# Patient Record
Sex: FEMALE | Race: OTHER | URBAN - METROPOLITAN AREA
[De-identification: names, ages, dates, MRNs, and addresses within clinical notes are randomized per-mention and may not be internally consistent; named-entity substitution may affect disease eponyms.]

---

## 2017-01-17 NOTE — PATIENT DISCUSSION
Posterior Capsular Fibrosis Counseling: The diagnosis of posterior capsular fibrosis (PCF), also referred to as a secondary cataract or posterior capsular opacification (PCO), was discussed with the patient. The patient understands and desires to monitor condition for now.

## 2017-01-17 NOTE — PATIENT DISCUSSION
Epiretinal Membrane Counseling: The diagnosis and natural history of epiretinal membrane was discussed with the patient including the risk of progression with retinal traction resulting in visual distortion. The patient is instructed to call back immediately if any vision changes, and keep follow up as scheduled.

## 2022-04-14 ENCOUNTER — NEW PATIENT (OUTPATIENT)
Dept: URBAN - METROPOLITAN AREA CLINIC 9 | Facility: CLINIC | Age: 52
End: 2022-04-14

## 2022-04-14 DIAGNOSIS — H17.13: ICD-10-CM

## 2022-04-14 DIAGNOSIS — H02.223: ICD-10-CM

## 2022-04-14 DIAGNOSIS — H04.123: ICD-10-CM

## 2022-04-14 DIAGNOSIS — H02.226: ICD-10-CM

## 2022-04-14 DIAGNOSIS — H18.832: ICD-10-CM

## 2022-04-14 DIAGNOSIS — H25.13: ICD-10-CM

## 2022-04-14 DIAGNOSIS — H16.223: ICD-10-CM

## 2022-04-14 PROCEDURE — 99204 OFFICE O/P NEW MOD 45 MIN: CPT

## 2022-04-14 RX ORDER — SODIUM CHLORIDE 50 MG/G
OINTMENT OPHTHALMIC EVERY OTHER DAY
Start: 2022-04-14

## 2022-04-14 RX ORDER — LOTEPREDNOL ETABONATE 5 MG/G
1/2 OINTMENT OPHTHALMIC EVERY OTHER DAY
Start: 2022-04-14

## 2022-04-14 ASSESSMENT — KERATOMETRY
OS_K1POWER_DIOPTERS: 40.00
OD_AXISANGLE_DEGREES: 151
OS_AXISANGLE_DEGREES: 42
OS_AXISANGLE2_DEGREES: 132
OD_K1POWER_DIOPTERS: 40.50
OS_K2POWER_DIOPTERS: 40.25
OD_AXISANGLE2_DEGREES: 61
OD_K2POWER_DIOPTERS: 40.75

## 2022-04-14 ASSESSMENT — VISUAL ACUITY
OD_CC: J1+
OS_GLARE: 20/25-1
OD_CC: 20/25
OD_GLARE: 20/25-1
OU_CC: J1+
OS_CC: J1+
OU_CC: 20/25
OS_CC: 20/25

## 2022-04-14 ASSESSMENT — TONOMETRY
OD_IOP_MMHG: 20
OS_IOP_MMHG: 21

## 2022-04-14 NOTE — PATIENT DISCUSSION
H/O trying Restasis and Xiidra with no improvement. She has also tried Lotemax gel drop and did not like the delivery process. Consider Lotemax drop (non gel).

## 2022-06-16 ENCOUNTER — ESTABLISHED PATIENT (OUTPATIENT)
Dept: URBAN - METROPOLITAN AREA CLINIC 9 | Facility: CLINIC | Age: 52
End: 2022-06-16

## 2022-06-16 DIAGNOSIS — H25.13: ICD-10-CM

## 2022-06-16 DIAGNOSIS — H17.13: ICD-10-CM

## 2022-06-16 DIAGNOSIS — H02.226: ICD-10-CM

## 2022-06-16 DIAGNOSIS — H18.832: ICD-10-CM

## 2022-06-16 DIAGNOSIS — H04.123: ICD-10-CM

## 2022-06-16 DIAGNOSIS — H02.223: ICD-10-CM

## 2022-06-16 DIAGNOSIS — H16.223: ICD-10-CM

## 2022-06-16 PROCEDURE — 92014 COMPRE OPH EXAM EST PT 1/>: CPT

## 2022-06-16 ASSESSMENT — TONOMETRY
OD_IOP_MMHG: 18
OS_IOP_MMHG: 19

## 2022-06-16 ASSESSMENT — VISUAL ACUITY
OU_CC: 20/20
OU_CC: J1
OS_CC: 20/25
OS_CC: J2
OD_CC: J2
OD_CC: 20/25

## 2022-06-16 NOTE — PATIENT DISCUSSION
Prior to opening her eyes when waking from sleeping, use Celluvisc. Encourage Celluvisc throughout the day.

## 2022-06-16 NOTE — PATIENT DISCUSSION
Completed Lotemax ointment sample. Ran out 2 weeks ago. Did not note if Mauro or Lotemax is better. Continue Mauro 128 ointment QHS OS.

## 2022-08-31 ENCOUNTER — PREPPED CHART (OUTPATIENT)
Dept: URBAN - METROPOLITAN AREA CLINIC 9 | Facility: CLINIC | Age: 52
End: 2022-08-31

## 2022-09-01 ENCOUNTER — ESTABLISHED PATIENT (OUTPATIENT)
Dept: URBAN - METROPOLITAN AREA CLINIC 9 | Facility: CLINIC | Age: 52
End: 2022-09-01

## 2022-09-01 DIAGNOSIS — H18.832: ICD-10-CM

## 2022-09-01 DIAGNOSIS — H16.223: ICD-10-CM

## 2022-09-01 DIAGNOSIS — H02.226: ICD-10-CM

## 2022-09-01 DIAGNOSIS — H02.223: ICD-10-CM

## 2022-09-01 DIAGNOSIS — H17.13: ICD-10-CM

## 2022-09-01 PROCEDURE — 99214 OFFICE O/P EST MOD 30 MIN: CPT

## 2022-09-01 RX ORDER — CYCLOSPORINE 0 MG/ML: 1 SOLUTION/ DROPS OPHTHALMIC; TOPICAL TWICE A DAY

## 2022-09-01 RX ORDER — LOTEPREDNOL ETABONATE 2.5 MG/ML: 1 SUSPENSION/ DROPS OPHTHALMIC AS NEEDED

## 2022-09-01 ASSESSMENT — VISUAL ACUITY
OS_CC: J1
OD_CC: J1+
OD_CC: 20/25-2
OS_CC: 20/25-2

## 2022-09-01 ASSESSMENT — TONOMETRY
OS_IOP_MMHG: 13
OD_IOP_MMHG: 12

## 2022-09-01 NOTE — PATIENT DISCUSSION
H/O trying Restasis, Chayito Betters with no improvement. She has also tried Lotemax gel drop and did not like the delivery process as well as Lotemax eladia.

## 2022-12-01 ENCOUNTER — ESTABLISHED PATIENT (OUTPATIENT)
Dept: URBAN - METROPOLITAN AREA CLINIC 9 | Facility: CLINIC | Age: 52
End: 2022-12-01

## 2022-12-01 PROCEDURE — 68761 CLOSE TEAR DUCT OPENING: CPT

## 2022-12-01 PROCEDURE — A4262 TEMPORARY TEAR DUCT PLUG: HCPCS

## 2022-12-01 PROCEDURE — 99213 OFFICE O/P EST LOW 20 MIN: CPT

## 2022-12-01 ASSESSMENT — VISUAL ACUITY
OS_CC: J1
OS_CC: 20/30-2
OD_CC: 20/30-2
OD_CC: J1

## 2022-12-01 ASSESSMENT — TONOMETRY
OS_IOP_MMHG: 15
OD_IOP_MMHG: 15

## 2022-12-01 NOTE — PATIENT DISCUSSION
H/O trying Restasis, Rush Swathi with no improvement. She has also tried Lotemax gel drop and did not like the delivery process. She is not using any overnight eladia. Hx of use of systane, ismael, Lotemax eladia without improvement.

## 2022-12-01 NOTE — PROCEDURE NOTE: CLINICAL
PROCEDURE NOTE: Punctal Plugs, Collagen  OU. Diagnosis: Moderate Keratoconjunctivitis Sicca. Anesthesia: Topical. Prep: Antibiotic Drops. Prior to treatment, the risks/benefits/alternatives were discussed. The patient wished to proceed with procedure. A time out to confirm the patient, site, and procedure has been achieved. The site has been marked. The patient was seated comfortably at the slit lamp. Puncta was dilated with punctal dilator. Collagen plugs were placed into the selected punctum with forceps. Size/location of plugs inserted: *. Patient tolerated procedure well. There were no complications. Post procedure instructions given. Penny Valverde

## 2022-12-01 NOTE — PATIENT DISCUSSION
Re-start Cequa BID OU. Consider Eysuvis for itching. Recommend Celluvisc or other thick drop prior to bedtime.

## 2022-12-01 NOTE — PATIENT DISCUSSION
Patient has BLL silicone punctal plugs. Offered patient BUL collagen plugs in office today or referral to provider for silicone BUL plugs. Patient would like to proceed with BUL plugs, which were placed in office today.

## 2024-02-07 NOTE — PATIENT DISCUSSION
Epiretinal Membrane Counseling: The diagnosis and natural history of epiretinal membrane was discussed with the patient. The patient is instructed to call back immediately if any vision changes, and keep follow up as scheduled. Her/She